# Patient Record
Sex: MALE | Race: WHITE | NOT HISPANIC OR LATINO | Employment: FULL TIME | URBAN - METROPOLITAN AREA
[De-identification: names, ages, dates, MRNs, and addresses within clinical notes are randomized per-mention and may not be internally consistent; named-entity substitution may affect disease eponyms.]

---

## 2022-12-27 ENCOUNTER — HOSPITAL ENCOUNTER (EMERGENCY)
Facility: HOSPITAL | Age: 22
Discharge: HOME | End: 2022-12-27
Attending: EMERGENCY MEDICINE | Admitting: EMERGENCY MEDICINE

## 2022-12-27 ENCOUNTER — APPOINTMENT (OUTPATIENT)
Dept: GENERAL RADIOLOGY | Facility: HOSPITAL | Age: 22
End: 2022-12-27

## 2022-12-27 VITALS
DIASTOLIC BLOOD PRESSURE: 72 MMHG | RESPIRATION RATE: 20 BRPM | SYSTOLIC BLOOD PRESSURE: 157 MMHG | TEMPERATURE: 98.7 F | WEIGHT: 290 LBS | HEIGHT: 76 IN | OXYGEN SATURATION: 100 % | BODY MASS INDEX: 35.31 KG/M2 | HEART RATE: 92 BPM
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
## 2022-12-27 DIAGNOSIS — S61.451A DOG BITE OF RIGHT HAND, INITIAL ENCOUNTER: Primary | ICD-10-CM

## 2022-12-27 PROCEDURE — 25010000002 TETANUS-DIPHTH-ACELL PERTUSSIS 5-2.5-18.5 LF-MCG/0.5 SUSPENSION PREFILLED SYRINGE: Performed by: NURSE PRACTITIONER

## 2022-12-27 PROCEDURE — 99283 EMERGENCY DEPT VISIT LOW MDM: CPT

## 2022-12-27 PROCEDURE — 90715 TDAP VACCINE 7 YRS/> IM: CPT | Performed by: NURSE PRACTITIONER

## 2022-12-27 PROCEDURE — 73130 X-RAY EXAM OF HAND: CPT

## 2022-12-27 PROCEDURE — 90471 IMMUNIZATION ADMIN: CPT | Performed by: NURSE PRACTITIONER

## 2022-12-27 RX ORDER — AMOXICILLIN AND CLAVULANATE POTASSIUM 875; 125 MG/1; MG/1
1 TABLET, FILM COATED ORAL 2 TIMES DAILY
Qty: 20 TABLET | Refills: 0 | Status: SHIPPED | OUTPATIENT
Start: 2022-12-27 | End: 2023-01-06

## 2022-12-27 RX ORDER — HYDROCODONE BITARTRATE AND ACETAMINOPHEN 5; 325 MG/1; MG/1
1 TABLET ORAL EVERY 6 HOURS PRN
Status: DISCONTINUED | OUTPATIENT
Start: 2022-12-27 | End: 2022-12-27 | Stop reason: HOSPADM

## 2022-12-27 RX ADMIN — TETANUS TOXOID, REDUCED DIPHTHERIA TOXOID AND ACELLULAR PERTUSSIS VACCINE, ADSORBED 0.5 ML: 5; 2.5; 8; 8; 2.5 SUSPENSION INTRAMUSCULAR at 09:03

## 2022-12-27 RX ADMIN — HYDROCODONE BITARTRATE AND ACETAMINOPHEN 1 TABLET: 5; 325 TABLET ORAL at 09:04

## 2022-12-27 NOTE — ED NOTES
Right hand cleaned with NS and betadine and dressing of 2x2's and coban applied. Wound care discussed with pt. He verbalized understanding.

## 2022-12-27 NOTE — ED NOTES
Right hand with redness and swelling, bite marks noted. Pt reports he was trying to break up a dog fight when he got bit by the dog. Clear/yellow drainage from wounds.

## 2022-12-27 NOTE — DISCHARGE INSTRUCTIONS
Take antibiotics as directed.  Clean hand with antibacterial soap twice a day.  Follow up with Hand doctor this week.   Return to ED for worsening swelling, redness, pain, fever.

## 2022-12-27 NOTE — ED PROVIDER NOTES
Subjective   History of Present Illness  Pt is 21 yo right handed male presenting to ED with complaint of dog bite to right hand 2 days ago. States his mother dog's got into a fight and he broke the fight up resulting in a bite to his hand. States noticed some swelling, redness, and drainage yesterday. Dogs vaccine UTD. Tetanus is not UTD.         Review of Systems   Constitutional: Negative for chills and fever.   Musculoskeletal: Positive for arthralgias (right hand).   Skin: Positive for wound (right hand).   Neurological: Negative for numbness.   Hematological: Negative for adenopathy. Does not bruise/bleed easily.       No past medical history on file.    No Known Allergies    No past surgical history on file.    No family history on file.    Social History     Socioeconomic History   • Marital status: Single           Objective   Physical Exam  Vitals and nursing note reviewed.   Constitutional:       Appearance: Normal appearance.   HENT:      Head: Normocephalic and atraumatic.   Eyes:      Extraocular Movements: Extraocular movements intact.      Conjunctiva/sclera: Conjunctivae normal.      Pupils: Pupils are equal, round, and reactive to light.   Pulmonary:      Effort: Pulmonary effort is normal.   Musculoskeletal:      Right hand: Swelling present. Normal capillary refill. Normal pulse.        Hands:       Cervical back: Normal range of motion and neck supple.   Skin:     General: Skin is warm and dry.   Neurological:      General: No focal deficit present.      Mental Status: He is alert and oriented to person, place, and time.         Procedures           ED Course      Medications   Tetanus-Diphth-Acell Pertussis (BOOSTRIX) injection 0.5 mL (0.5 mL Intramuscular Given 12/27/22 0903)         0936: Discussed ED findings with pt, plan for discharge, need for follow up with Hand doctor, and strong return instructions. Pt verbalized understanding and agrees with plan.                                       MDM  Number of Diagnoses or Management Options  Dog bite of right hand, initial encounter: new and requires workup     Amount and/or Complexity of Data Reviewed  Tests in the radiology section of CPT®: reviewed  Tests in the medicine section of CPT®: ordered and reviewed    Risk of Complications, Morbidity, and/or Mortality  Presenting problems: low  Diagnostic procedures: low  Management options: low    Patient Progress  Patient progress: stable      Final diagnoses:   Dog bite of right hand, initial encounter       ED Disposition  ED Disposition     ED Disposition   Discharge    Condition   Stable    Comment   --             [facility]  [address] [address] [phone]  Call today           Medication List      New Prescriptions    amoxicillin-clavulanate 875-125 MG per tablet  Commonly known as: AUGMENTIN  Take 1 tablet by mouth 2 (Two) Times a Day for 10 days.           Where to Get Your Medications      These medications were sent to [id number] - [place], [place] - [address] AT [facility] [phone]  - [phone]   [address] [place] [address] [place] [address]    Phone: [phone]   · amoxicillin-clavulanate 875-125 MG per tablet          Za Leary, SILVIA  12/27/22 1312